# Patient Record
Sex: FEMALE | Race: WHITE
[De-identification: names, ages, dates, MRNs, and addresses within clinical notes are randomized per-mention and may not be internally consistent; named-entity substitution may affect disease eponyms.]

---

## 2018-03-12 ENCOUNTER — HOSPITAL ENCOUNTER (OUTPATIENT)
Dept: HOSPITAL 96 - M.LAB | Age: 73
End: 2018-03-12
Attending: INTERNAL MEDICINE
Payer: COMMERCIAL

## 2018-03-12 DIAGNOSIS — R20.0: ICD-10-CM

## 2018-03-12 DIAGNOSIS — I10: ICD-10-CM

## 2018-03-12 DIAGNOSIS — G44.229: Primary | ICD-10-CM

## 2018-03-12 LAB
BUN SERPL-MCNC: 10 MG/DL (ref 7–18)
CREAT SERPL-MCNC: 0.8 MG/DL (ref 0.6–1.3)

## 2018-03-16 ENCOUNTER — HOSPITAL ENCOUNTER (OUTPATIENT)
Dept: HOSPITAL 96 - M.ULTRA | Age: 73
End: 2018-03-16
Attending: INTERNAL MEDICINE
Payer: COMMERCIAL

## 2018-03-16 DIAGNOSIS — I10: Primary | ICD-10-CM

## 2018-04-03 ENCOUNTER — HOSPITAL ENCOUNTER (OUTPATIENT)
Dept: HOSPITAL 96 - M.CRD | Age: 73
End: 2018-04-03
Attending: INTERNAL MEDICINE
Payer: COMMERCIAL

## 2018-04-03 DIAGNOSIS — I10: ICD-10-CM

## 2018-04-03 DIAGNOSIS — I37.1: Primary | ICD-10-CM

## 2018-04-03 NOTE — 2DMMODE
[place] [address]
Phone:  [phone] 2 D/M-MODE ECHOCARDIOGRAM     
_______________________________________________________________________________
 
Name:         LATOYA LOPEZ                Room:                     REG CLI
M.R.#:    [id number]     Account #:     [id number]  
Admission:    18    Attend Phys:   Vern Pizarro, 
Discharge:                Date of Birth: [birth year]  
Date of Service: 18 1526  Report #:      [id number]
        [id number]
_______________________________________________________________________________
THIS REPORT FOR:  //name//                      
 
 
--------------- APPROVED REPORT --------------
 
 
Study performed:  2018 09:23:34
 
EXAM: Comprehensive 2D, Doppler, and color-flow 
Echocardiogram 
Patient Location: Out-Patient   
      Status:  routine
 
      BSA:         2.17
HR: 75 bpm BP:          170/92 mmHg 
 
Other Information 
Study Quality: Good
 
Indications
Chest Pain
 
2D Dimensions
   LVEF(%):  62.78 (&gt;50%)
IVSd:  12.28 (7-11mm) LVOT Diam:  20.25 (18-24mm) 
LVDd:  46.23 mm  
PWd:  11.47 (7-11mm) Ascending Ao:  32.19 (22-36mm)
LVDs:  30.58 (25-40mm) 
Aortic Root:  26.62 mm 
   Simmons's LVEF:  62.78 %
 
Volumes
Left Atrial Volume (Systole) 
    LA ESV Index:  14.40 mL/m2
 
Aortic Valve
AoV Peak Yasir.:  1.27 m/s 
AO Peak Gr.:  6.46 mmHg  LVOT Max P.88 mmHg
AO Mean Gr.:  3.60 mmHg  LVOT Mean P.34 mmHg
    LVOT Max V:  0.85 m/s
AO V2 VTI:  26.26 cm  LVOT Mean V:  0.52 m/s
DEON (VTI):  2.52 cm2  LVOT V1 VTI:  20.54 cm
 
Mitral Valve
    E/A Ratio:  1.00
    MV Decel. Time:  215.48 ms
 
 
[place] [address]
Phone:  [phone]                     2 D/M-MODE ECHOCARDIOGRAM     
_______________________________________________________________________________
 
Name:         LATOYA LOPEZ                Room:                     REG CLI
M.R.#:    [id number]     Account #:     [id number]  
Admission:    18    Attend Phys:   Vern Pizarro, 
Discharge:                Date of Birth: [birth year]  
Date of Service: 18 1526  Report #:      [id number]
        [id number]
_______________________________________________________________________________
MV E Max Yasir.:  0.79 m/s 
MV PHT:  62.49 ms  
MVA (PHT):  3.52 cm2  
 
TDI
E/Lateral E':  7.18 E/Medial E':  8.78
   Medial E' Yasir.:  0.09 m/s
   Lateral E' Yasir.:  0.11 m/s
 
Pulmonary Valve
PV Peak Yasir.:  0.86 m/s PV Peak Gr.:  2.96 mmHg
 
Left Ventricle
The left ventricle is normal size. There is normal LV segmental wall 
motion. There is normal left ventricular wall thickness. Left 
ventricular systolic function is normal. LVEF is 55-60%. Transmitral 
Doppler flow pattern suggests impaired LV relaxation.
 
Right Ventricle
The right ventricle is normal size. The right ventricular systolic 
function is normal.
 
Atria
The left atrium size is normal. The right atrium size is 
normal.
 
Aortic Valve
The aortic valve is normal in structure. No aortic regurgitation is 
present. There is no aortic valvular stenosis.
 
Mitral Valve
The mitral valve is normal in structure. There is no mitral valve 
regurgitation noted. No evidence of mitral valve stenosis.
 
Tricuspid Valve
The tricuspid valve is normal in structure. There is no tricuspid 
valve regurgitation noted.
 
Pulmonic Valve
The pulmonary valve is normal in structure. Mild pulmonic 
regurgitation.
 
Great Vessels
The aortic root is normal in size. IVC is normal in size and 
collapses with &gt;50% inspiration
 
 
 
[place] [address]
Phone:  [phone]                     2 D/M-MODE ECHOCARDIOGRAM     
_______________________________________________________________________________
 
Name:         LATOYA LOPEZ DORENE                Room:                     REG CLI
M.R.#:    [id number]     Account #:     [id number]  
Admission:    18    Attend Phys:   Vern Pizarro, 
Discharge:                Date of Birth: [birth year]  
Date of Service: 18 1526  Report #:      [id number]
        [id number]
_______________________________________________________________________________
Pericardium
There is no pericardial effusion.
 
&lt;Conclusion&gt;
The left ventricle is normal size.
There is normal left ventricular wall thickness.
Left ventricular systolic function is normal.
LVEF is 55-60%.
Transmitral Doppler flow pattern suggests impaired LV 
relaxation.
Mild pulmonic regurgitation.
 
 
 
 
 
 
 
 
 
 
 
 
 
 
 
 
 
 
 
 
 
 
 
 
 
 
 
 
 
 
 
 
 
  <ELECTRONICALLY SIGNED>
                                           By: Zachary Dickson MD, FACC   
  18     1526
D: 18 1526   _____________________________________
T: 18 1526   Zachary Dickson MD, FACC     /INF

## 2018-04-05 NOTE — TST
[place] [address]
Phone:  [phone]                     TREADMILL STRESS TEST         
_______________________________________________________________________________
 
Name:         JESSICALATOYA J                Room:                     REG CLI
M.R.#:    [id number]     Account #:     [id number]  
Admission:    04/03/18    Attend Phys:   Rodolfo Pizarro, 
Discharge:                Date of Birth: [birth year]  
Date of Service: 04/03/18 1735  Report #:      [id number]
        [id number]     
_______________________________________________________________________________
THIS REPORT FOR:  //name//                      
 
CC: RODOLFO Pizarro
 
DATE OF SERVICE:  04/03/2018
 
 
INDICATIONS:  Exercise stress test was requested in this patient with a history
of hypertension.
 
PROCEDURE:  An exercise stress test was requested in this patient and was
performed using a Antolin protocol from a Pretest heart rate of 70, blood pressure
174/70.  The patient was able to only exercise for 4 minutes and 20 seconds
achieving a peak heart rate of 154, which was greater than 90% of maximum
predicted heart rate for the patient's age.  Peak blood pressure 255/74.  In
recovery, the patient had a heart rate of 100, blood pressure 180/60.  The
patient denied chest pain with exercise, was terminated because of fatigue and
elevated blood pressure.  The patient's resting ECG showed a sinus rhythm with
nonspecific ST segment depression at baseline.  With exercise, there was no
arrhythmia noted.  There was rare PVC.  The patient did develop upsloping ST
segment depression with exercise and at peak exercise was noted to have 1.5 mm
of upsloping ST segment depression in V4, V5 and V6.  ST segment changes
persisted beyond 6 minutes in recovery.
 
IMPRESSION:
1.  Poor exercise tolerance.
2.  No chest pain with exercise.
3.  Hypertensive blood pressure response to exercise.
4.  Nonspecific ST segment changes noted at baseline.
5.  Nonspecific ST segment changes noted with exercise.
6.  Nondiagnostic exercise stress test for myocardial ischemia secondary to
baseline ECG abnormalities.
7.  If clinically indicated, I would consider a pharmacologic nuclear stress
testing to rule out ischemia.
 
 
 
 
 
 
 
 
 
  <ELECTRONICALLY SIGNED>
                                           By: Rodolfo Acuna MD, FACC      
  04/05/18     1742
D: 04/03/18 1735   _____________________________________
T: 04/03/18 2112   Rodolfo Acuna MD, FACC        /nt

## 2018-08-17 ENCOUNTER — HOSPITAL ENCOUNTER (OUTPATIENT)
Dept: HOSPITAL 96 - M.RAD | Age: 73
End: 2018-08-17
Attending: INTERNAL MEDICINE
Payer: COMMERCIAL

## 2018-08-17 DIAGNOSIS — Z12.31: Primary | ICD-10-CM
